# Patient Record
Sex: MALE | Race: WHITE | Employment: FULL TIME | URBAN - METROPOLITAN AREA
[De-identification: names, ages, dates, MRNs, and addresses within clinical notes are randomized per-mention and may not be internally consistent; named-entity substitution may affect disease eponyms.]

---

## 2020-07-08 ENCOUNTER — OFFICE VISIT (OUTPATIENT)
Dept: URGENT CARE | Facility: CLINIC | Age: 25
End: 2020-07-08
Payer: OTHER GOVERNMENT

## 2020-07-08 VITALS
DIASTOLIC BLOOD PRESSURE: 58 MMHG | OXYGEN SATURATION: 96 % | SYSTOLIC BLOOD PRESSURE: 109 MMHG | HEIGHT: 66 IN | BODY MASS INDEX: 21.38 KG/M2 | WEIGHT: 133 LBS | TEMPERATURE: 98 F | HEART RATE: 119 BPM

## 2020-07-08 DIAGNOSIS — Z20.822 EXPOSURE TO COVID-19 VIRUS: Primary | ICD-10-CM

## 2020-07-08 PROCEDURE — U0003 INFECTIOUS AGENT DETECTION BY NUCLEIC ACID (DNA OR RNA); SEVERE ACUTE RESPIRATORY SYNDROME CORONAVIRUS 2 (SARS-COV-2) (CORONAVIRUS DISEASE [COVID-19]), AMPLIFIED PROBE TECHNIQUE, MAKING USE OF HIGH THROUGHPUT TECHNOLOGIES AS DESCRIBED BY CMS-2020-01-R: HCPCS

## 2020-07-08 PROCEDURE — 99214 PR OFFICE/OUTPT VISIT, EST, LEVL IV, 30-39 MIN: ICD-10-PCS | Mod: TIER,S$GLB,, | Performed by: PHYSICIAN ASSISTANT

## 2020-07-08 PROCEDURE — 99214 OFFICE O/P EST MOD 30 MIN: CPT | Mod: TIER,S$GLB,, | Performed by: PHYSICIAN ASSISTANT

## 2020-07-08 NOTE — PROGRESS NOTES
"Subjective:       Patient ID: Tonio Acevedo is a 24 y.o. male.    Vitals:  height is 5' 6" (1.676 m) and weight is 60.3 kg (133 lb). His tympanic temperature is 98.3 °F (36.8 °C). His blood pressure is 109/58 (abnormal) and his pulse is 119 (abnormal). His oxygen saturation is 96%.     Chief Complaint: COVID-19 Concerns    Pt was exposed to Covid last Wednesday.  He started experiencing symptoms x 2 days.  Patient reports fatigue, sinus pressure and congestion, nonproductive cough with associated shortness of breath.  Patient states that shortness of breath and chest tightness is intermittent and usually worse with coughing.  He denies any wheezing, palpitations, fever/chills, abdominal pain, nausea/vomiting.  He has only taken Tylenol for symptoms.    URI   This is a new problem. The current episode started in the past 7 days. The problem has been gradually worsening. There has been no fever. Associated symptoms include congestion, coughing, headaches, nausea and a sore throat. Pertinent negatives include no ear pain, rash, sinus pain, vomiting or wheezing. He has tried acetaminophen for the symptoms. The treatment provided mild relief.       Constitution: Positive for fatigue. Negative for chills, sweating and fever.   HENT: Positive for congestion, sinus pressure and sore throat. Negative for ear pain, sinus pain and voice change.    Neck: Negative for painful lymph nodes.   Eyes: Negative for eye redness.   Respiratory: Positive for chest tightness, cough and shortness of breath. Negative for sputum production, bloody sputum, COPD, stridor, wheezing and asthma.    Gastrointestinal: Positive for nausea. Negative for vomiting.   Musculoskeletal: Negative for muscle ache.   Skin: Negative for rash.   Allergic/Immunologic: Negative for seasonal allergies and asthma.   Neurological: Positive for light-headedness and headaches.   Hematologic/Lymphatic: Negative for swollen lymph nodes.       Objective:    " "  Physical Exam   Constitutional: He is oriented to person, place, and time. He appears well-developed. He is cooperative.  Non-toxic appearance. He does not appear ill. No distress.   HENT:   Head: Normocephalic and atraumatic.   Right Ear: Hearing, tympanic membrane, external ear and ear canal normal.   Left Ear: Hearing, tympanic membrane, external ear and ear canal normal.   Nose: Nose normal. No mucosal edema, rhinorrhea or nasal deformity. No epistaxis. Right sinus exhibits no maxillary sinus tenderness and no frontal sinus tenderness. Left sinus exhibits no maxillary sinus tenderness and no frontal sinus tenderness.   Mouth/Throat: Uvula is midline, oropharynx is clear and moist and mucous membranes are normal. No trismus in the jaw. Normal dentition. No uvula swelling. No oropharyngeal exudate, posterior oropharyngeal edema or posterior oropharyngeal erythema.   Eyes: Conjunctivae and lids are normal. No scleral icterus.   Neck: Trachea normal, full passive range of motion without pain and phonation normal. Neck supple. No neck rigidity. No edema and no erythema present.   Cardiovascular: Normal rate, regular rhythm, normal heart sounds and normal pulses.   Pulmonary/Chest: Effort normal and breath sounds normal. No respiratory distress. He has no decreased breath sounds. He has no rhonchi.   Abdominal: Normal appearance.   Musculoskeletal: Normal range of motion.         General: No deformity.   Neurological: He is alert and oriented to person, place, and time. He exhibits normal muscle tone. Coordination normal.   Skin: Skin is warm, dry, intact, not diaphoretic and not pale.   Psychiatric: His speech is normal and behavior is normal. Judgment and thought content normal.   Nursing note and vitals reviewed.        Assessment:       1. Exposure to Covid-19 Virus        Plan:       Offered patient inhaler for intermittent shortness of breath with coughing however he declined at this time stating "it's not " "that bad"  - COVID-19 screen pending.  - Advised patient to assume positive for COVID-19 and self quarantine until receiving results. If positive results, pt instructed to stay home and self quarantine until 10 days from onset of symptoms, and asymptomatic for at least 72 hours prior to resuming normal activity.  -Tylenol or Ibuprofen for fever control. OTC medications prn for cold symptoms.   - Strict ED precautions given for any emergent symptoms.    Exposure to Covid-19 Virus  -     COVID-19 Routine Screening      Patient Instructions   General Instructions for URI:    Symptomatic treatment:     Alternate Tylenol and Ibuprofen every 3 hrs for fever, pain and inflammation. Avoid Nsaids if you are pregnant or have advanced kidney disease.      Salt water gargles/Chloroseptic spray to soothe throat from post nasal drip  Honey/lemon water or warm tea to soothe throat from post nasal drip  Cepachol helps to soothe the discomfort in throat from post nasal drip     Cold-eeze helps to reduce the duration of URI symptoms if taken early  Elderberry to reduce duration of viral URI symptoms     Nasal saline spray reduces inflammation and dryness  Warm face compresses/hot showers as often as you can to open sinuses and allow to drain.   Flonase OTC or Nasacort OTC to help decrease inflammation in nasal turbinates and allow sinuses to drain     Vicks vapor rub at night  Simple foods like chicken noodle soup help provide hydration and nutrition     Delsym helps with coughing at night     Zantac will help if there is reflux from the post nasal drip and helpful to take at night     Zyrtec/Claritin during the day and Benadryl at night may help if allergy component concurrently with URI     If you DO NOT have Hypertension or any history of palpitations, it is ok to take over the counter Sudafed or Mucinex D  or Allegra-D or Claritin-D or Zyrtec-D.  If you do take one of the above, it is ok to combine that with plain over the " counter Mucinex or Allegra or Claritin or  Zyrtec. If, for example, you are taking Zyrtec -D, you can combine that with Mucinex, but not Mucinex-D. If you are  taking Mucinex-D, you can combine that with plain Allegra or Claritin or Zyrtec.  If you DO have Hypertension or palpitations, it is safe to take Coricidin HBP for relief of sinus symptoms.  Please follow up with your primary care provider within 2-5 days if your signs and symptoms have not resolved or  worsen.  If your condition worsens or fails to improve we recommend that you receive another evaluation at the emergency  room immediately or contact your primary medical clinic to discuss your concerns.  You must understand that you have received an Urgent Care treatment only and that you may be released before all of  your medical problems are known or treated. You, the patient, will arrange for follow up care as instructed.    Rest as much as you can     Your symptoms will likely last 5-7 days, maybe longer depending on how it affects your body.  You are contagious 5-7, so minimize contact with others to reduce the spread to others and stay home from work or school as we discussed. Dehydration is preventable but is one of the main reasons why you will feel so badly. Drink pedialyte, gatorade or propel. Stay hydrated.  Antibiotics are not needed unless a complication(such as Otitis Media, Bacterial sinus infection or pneumonia) develops. Taking antibiotics for Flu/Cold is not supported by evidence-based medicine and can expose you to unnecessary side effects of the medication, such as anaphylaxis, yeast infection and leads to antibiotic resistance.   If you experience any:  Chest pain, shortness of breath, wheezing or difficulty breathing,  Severe headache, face, neck or ear pain,  New rash,  Fever over 101.5º F (38.6 C) for more than three days,  Confusion, behavior change or seizure,  Severe weakness or dizziness, please go to the ER immediately for  further testing.

## 2020-07-08 NOTE — PATIENT INSTRUCTIONS
General Instructions for URI:    Symptomatic treatment:     Alternate Tylenol and Ibuprofen every 3 hrs for fever, pain and inflammation. Avoid Nsaids if you are pregnant or have advanced kidney disease.      Salt water gargles/Chloroseptic spray to soothe throat from post nasal drip  Honey/lemon water or warm tea to soothe throat from post nasal drip  Cepachol helps to soothe the discomfort in throat from post nasal drip     Cold-eeze helps to reduce the duration of URI symptoms if taken early  Elderberry to reduce duration of viral URI symptoms     Nasal saline spray reduces inflammation and dryness  Warm face compresses/hot showers as often as you can to open sinuses and allow to drain.   Flonase OTC or Nasacort OTC to help decrease inflammation in nasal turbinates and allow sinuses to drain     Vicks vapor rub at night  Simple foods like chicken noodle soup help provide hydration and nutrition     Delsym helps with coughing at night     Zantac will help if there is reflux from the post nasal drip and helpful to take at night     Zyrtec/Claritin during the day and Benadryl at night may help if allergy component concurrently with URI     If you DO NOT have Hypertension or any history of palpitations, it is ok to take over the counter Sudafed or Mucinex D  or Allegra-D or Claritin-D or Zyrtec-D.  If you do take one of the above, it is ok to combine that with plain over the counter Mucinex or Allegra or Claritin or  Zyrtec. If, for example, you are taking Zyrtec -D, you can combine that with Mucinex, but not Mucinex-D. If you are  taking Mucinex-D, you can combine that with plain Allegra or Claritin or Zyrtec.  If you DO have Hypertension or palpitations, it is safe to take Coricidin HBP for relief of sinus symptoms.  Please follow up with your primary care provider within 2-5 days if your signs and symptoms have not resolved or  worsen.  If your condition worsens or fails to improve we recommend that you receive  another evaluation at the emergency  room immediately or contact your primary medical clinic to discuss your concerns.  You must understand that you have received an Urgent Care treatment only and that you may be released before all of  your medical problems are known or treated. You, the patient, will arrange for follow up care as instructed.    Rest as much as you can     Your symptoms will likely last 5-7 days, maybe longer depending on how it affects your body.  You are contagious 5-7, so minimize contact with others to reduce the spread to others and stay home from work or school as we discussed. Dehydration is preventable but is one of the main reasons why you will feel so badly. Drink pedialyte, gatorade or propel. Stay hydrated.  Antibiotics are not needed unless a complication(such as Otitis Media, Bacterial sinus infection or pneumonia) develops. Taking antibiotics for Flu/Cold is not supported by evidence-based medicine and can expose you to unnecessary side effects of the medication, such as anaphylaxis, yeast infection and leads to antibiotic resistance.   If you experience any:  Chest pain, shortness of breath, wheezing or difficulty breathing,  Severe headache, face, neck or ear pain,  New rash,  Fever over 101.5º F (38.6 C) for more than three days,  Confusion, behavior change or seizure,  Severe weakness or dizziness, please go to the ER immediately for further testing.

## 2020-07-08 NOTE — LETTER
[id number] ? [place], [address] ? Phone [phone] ? Fax             Return to Work/School    Patient: Tonio Acevedo  YOB: 1995   Date: 07/08/2020      To Whom It May Concern:     Tonio Acevedo was in contact with/seen in my office on 07/08/2020. COVID-19 is present in our communities across the state. Not all patients are eligible or appropriate to be tested. In this situation, your employee meets the following criteria:     Tonio Acevedo has met the criteria for COVID-19 testing based upon symptoms, travel, and/or potential exposure. The test has been completed and is pending results at this time. During this time the employee is not able to work and should be quarantined per the Centers for Disease Control timelines.      If you have any questions or concerns, or if I can be of further assistance, please do not hesitate to contact me.     Sincerely,    Raquel Lau PA-C

## 2020-07-10 LAB — SARS-COV-2 RNA RESP QL NAA+PROBE: NOT DETECTED

## 2021-04-28 ENCOUNTER — PATIENT MESSAGE (OUTPATIENT)
Dept: RESEARCH | Facility: HOSPITAL | Age: 26
End: 2021-04-28